# Patient Record
Sex: FEMALE | Race: WHITE | NOT HISPANIC OR LATINO | Employment: FULL TIME | URBAN - METROPOLITAN AREA
[De-identification: names, ages, dates, MRNs, and addresses within clinical notes are randomized per-mention and may not be internally consistent; named-entity substitution may affect disease eponyms.]

---

## 2024-08-06 ENCOUNTER — HOSPITAL ENCOUNTER (EMERGENCY)
Facility: CLINIC | Age: 49
Discharge: HOME | End: 2024-08-06
Attending: EMERGENCY MEDICINE | Admitting: EMERGENCY MEDICINE
Payer: OTHER MISCELLANEOUS

## 2024-08-06 VITALS
HEART RATE: 95 BPM | TEMPERATURE: 97.6 F | SYSTOLIC BLOOD PRESSURE: 104 MMHG | DIASTOLIC BLOOD PRESSURE: 91 MMHG | OXYGEN SATURATION: 98 % | RESPIRATION RATE: 16 BRPM

## 2024-08-06 DIAGNOSIS — S44.91XA NEURAPRAXIA OF RIGHT UPPER EXTREMITY, INITIAL ENCOUNTER: ICD-10-CM

## 2024-08-06 PROCEDURE — 99282 EMERGENCY DEPT VISIT SF MDM: CPT

## 2024-08-06 ASSESSMENT — ACTIVITIES OF DAILY LIVING (ADL)
ADLS_ACUITY_SCORE: 33
ADLS_ACUITY_SCORE: 33

## 2024-08-06 ASSESSMENT — COLUMBIA-SUICIDE SEVERITY RATING SCALE - C-SSRS
6. HAVE YOU EVER DONE ANYTHING, STARTED TO DO ANYTHING, OR PREPARED TO DO ANYTHING TO END YOUR LIFE?: NO
2. HAVE YOU ACTUALLY HAD ANY THOUGHTS OF KILLING YOURSELF IN THE PAST MONTH?: NO
1. IN THE PAST MONTH, HAVE YOU WISHED YOU WERE DEAD OR WISHED YOU COULD GO TO SLEEP AND NOT WAKE UP?: NO

## 2024-08-06 NOTE — ED PROVIDER NOTES
History     Chief Complaint:  Numbness       HPI   Dominique Avendano is a 48 year old female with previous partial bicep tear right side that has been seeing [facility] and now had trial cortisone and some type of anesthetic injection into right shoulder joint.  Prior to injection she had no symptoms.  No HA vision changes fever neck pain CP SOB arm or leg weakness or swelling.  Had injection at 1130.  Shortly after felt sensation and motor changes up right side of neck and down right arm with loss of feeling some motor changes.  She also has extensive panic and anxiety history.  She started to panicky felt whole body shaking and numbness finger toes and throughout body.  She pulled over called  tried to call [facility] for advice they recommended here.  Now after time has passed symptoms have improved greatly with both time and calming of her partner.        Independent Historian:        Review of External Notes:  Pt has paper instructions from [facility] in the room.     Medications:    No current outpatient medications on file.      Past Medical History:    No past medical history on file.    Past Surgical History:    No past surgical history on file.       Physical Exam   Patient Vitals for the past 24 hrs:   BP Temp Temp src Pulse Resp SpO2   08/06/24 1240 (!) 104/91 97.6  F (36.4  C) Temporal 95 16 98 %        Physical Exam  General: Patient is well appearing. No distress.  Head: Atraumatic.  Eyes: Conjunctivae and EOM are normal. No scleral icterus. Pupils equal.  Neck: Normal range of motion. Neck supple.  No midline tenderness.  No bruit.  No changes with rotation.    Cardiovascular: Normal rate, regular rhythm, normal heart sounds and intact distal pulses.   Pulmonary/Chest: Breath sounds normal. No respiratory distress.  Abdominal: Soft. Bowel sounds are normal. No distension. No tenderness. No rebound or guarding.   Neuro: Alert, oriented x3, PERRL, EOMI, CN 2-7 and 9-12 intact, grasp 4/5 right.  Left  5/5, 5/5 elbow flexion and extension BUE, 5/5 shoulder abduction BUE, 5/5 hip flexion, knee flexion, knee extension, plantar and dorsiflexion BLE, no pronator drift, normal gait, negative romberg, no dysdiadochokinesia, normal finger-nose-finger testing     Musculoskeletal: Normal range of motion.  Injection site posterolateral right shoulder CDI.    Skin: Warm and dry. No rash noted. Not diaphoretic.      Emergency Department Course   ECG      Imaging:  No orders to display       Laboratory:  Labs Ordered and Resulted from Time of ED Arrival to Time of ED Departure - No data to display     Procedures     Emergency Department Course & Assessments:    Interventions:  Medications - No data to display     Assessments:      Independent Interpretation (X-rays, CTs, rhythm strip):      Consultations/Discussion of Management or Tests:         Social Determinants of Health affecting care:       Disposition:  The patient was discharged.    Impression & Plan           Medical Decision Making:  Pt has panic and anxiety coupled with completley normal in every way until shoulder injection.  Before panic set in this was isolated to nerve bundles right upper extremity.  I believe this is likely a localized reaction/effect to the injection and not a systemic central nervous or other systemic issue.  As time progressed things have been returning to normal,  nonetheless I discussed at length the risk of stroke offered full workup including CT CTA labs and other pertinent workup.  They understand and want to hold for now observe at home and understand strict return and follow up instructions.      Diagnosis:    ICD-10-CM    1. Neurapraxia of right upper extremity, initial encounter  S44.91XA            Discharge Medications:  New Prescriptions    No medications on file            8/6/2024   Geraldo Carolina MD Stevens, Andrew C, MD  08/06/24 [id number]

## 2024-08-06 NOTE — ED TRIAGE NOTES
Pt here with c/o RUE, RLE and R sided facial numbness that started around 1145 about 15 mins after getting a cortisone shot to R shoulder at [facility]. No facial droop, slurred speech, arm drift, visual changes, or gait issues. Denies HA. Able to , decreased coordination with RUE when touching hand to nose. ABC intact. A&O x4    Dr. Carolina made aware of pt. No immediate orders placed.